# Patient Record
(demographics unavailable — no encounter records)

---

## 2024-10-26 NOTE — HISTORY OF PRESENT ILLNESS
[de-identified] : follow up fever and congestion and runny nose had fever tmax 102 no distress  [FreeTextEntry6] : fever resolved after two days no fever no distress no issues otherwise has been alert and active

## 2024-10-26 NOTE — DISCUSSION/SUMMARY
[FreeTextEntry1] : resolved URI if fever resturns to call back  apperaed well active iigabriel marion

## 2024-10-30 NOTE — HISTORY OF PRESENT ILLNESS
[Fever] : FEVER [___ Day(s)] : [unfilled] day(s) [Constant] : constant [Irritable] : irritable [Crying] : crying [At Night] : at night [Acetaminophen] : acetaminophen [Ibuprofen] : ibuprofen [Change in sleep pattern] : change in sleep pattern [Runny Nose] : runny nose [Cough] : cough [Decreased Appetite] : decreased appetite [Worsening] : worsening [Known Exposure to COVID-19] : no known exposure to COVID-19 [Hx of recent COVID-19 infection] : no history of recent COVID-19 infection [Sick Contacts: ___] : no sick contacts [Eye Redness] : no eye redness [Eye Discharge] : no eye discharge [Ear Tugging] : no ear tugging [Nasal Congestion] : no nasal congestion [Teething] : no teething [Wheezing] : no wheezing [Vomiting] : no vomiting [Diarrhea] : no diarrhea [Decreased Urine Output] : no decreased urine output [Rash] : no rash [FreeTextEntry6] : tmax 102 no other symptoms drinking well slight congestion no heavy cough in between fever was acting well no distress no pain no issues  [de-identified] : fever for 2 days

## 2024-10-30 NOTE — HISTORY OF PRESENT ILLNESS
[Fever] : FEVER [___ Day(s)] : [unfilled] day(s) [Constant] : constant [Irritable] : irritable [Crying] : crying [At Night] : at night [Acetaminophen] : acetaminophen [Ibuprofen] : ibuprofen [Change in sleep pattern] : change in sleep pattern [Runny Nose] : runny nose [Cough] : cough [Decreased Appetite] : decreased appetite [Worsening] : worsening [Known Exposure to COVID-19] : no known exposure to COVID-19 [Hx of recent COVID-19 infection] : no history of recent COVID-19 infection [Sick Contacts: ___] : no sick contacts [Eye Redness] : no eye redness [Eye Discharge] : no eye discharge [Ear Tugging] : no ear tugging [Nasal Congestion] : no nasal congestion [Teething] : no teething [Wheezing] : no wheezing [Vomiting] : no vomiting [Diarrhea] : no diarrhea [Rash] : no rash [Decreased Urine Output] : no decreased urine output [de-identified] : fever for 2 days  [FreeTextEntry6] : tmax 102 no other symptoms drinking well slight congestion no heavy cough in between fever was acting well no distress no pain no issues

## 2024-10-30 NOTE — DISCUSSION/SUMMARY
[FreeTextEntry1] : fever control  fluids if continues with fever in 2 days to call back  follow up as needed

## 2024-11-23 NOTE — PHYSICAL EXAM
LTM/EMU   Video: Yes  Photic: No  HV: No  Impedances: Under 5  Leads Fixed: Yes  Events seen: No  Patient ratio: 1/3   [Clear Rhinorrhea] : clear rhinorrhea [Mucoid Discharge] : mucoid discharge [Wheezing] : wheezing [Rales] : rales [Rhonchi] : no rhonchi [NL] : warm, clear

## 2024-11-23 NOTE — HISTORY OF PRESENT ILLNESS
[FreeTextEntry6] : states that has been coughing on  and off for the last 3 days mostly in the am mother hs just been giving saline otherwise doing well no distress  no diarrhea no other issues

## 2024-12-05 NOTE — PHYSICAL EXAM
[Clear Rhinorrhea] : clear rhinorrhea [Mucoid Discharge] : mucoid discharge [Wheezing] : wheezing [Rales] : rales [Rhonchi] : no rhonchi [NL] : warm, clear [de-identified] : right parotid and neck area swelling soft nonn tneder no erythema mild improvemnt from the other day

## 2024-12-05 NOTE — HISTORY OF PRESENT ILLNESS
[FreeTextEntry6] : follow up on parotitis is doing well no distress no fever eating well started meds 2 days ago and all blood work was reviewed and was all normal no vomiting no distress

## 2024-12-05 NOTE — DISCUSSION/SUMMARY
[FreeTextEntry1] : continue meds rtc in 1 week  send for sonogram if no imprvemnet then will send to [facility] as well

## 2024-12-20 NOTE — HISTORY OF PRESENT ILLNESS
[de-identified] : Follow up [FreeTextEntry6] : Pt is in today for a bump that she has on her neck, as per mom still the same size. no fever acting well was diagnosed with MUMPS

## 2024-12-20 NOTE — HISTORY OF PRESENT ILLNESS
[de-identified] : Follow up [FreeTextEntry6] : Pt is in today for a bump that she has on her neck, as per mom still the same size. no fever acting well was diagnosed with MUMPS

## 2024-12-20 NOTE — HISTORY OF PRESENT ILLNESS
[de-identified] : Follow up [FreeTextEntry6] : Pt is in today for a bump that she has on her neck, as per mom still the same size. no fever acting well was diagnosed with MUMPS

## 2024-12-20 NOTE — DISCUSSION/SUMMARY
[FreeTextEntry1] : send for sonogram  follo wup with ENT most likely to rule out cycstic hygroma expected relief not imprving in 4 weeks  rtc S/p consultant

## 2024-12-20 NOTE — HISTORY OF PRESENT ILLNESS
[de-identified] : Follow up [FreeTextEntry6] : Pt is in today for a bump that she has on her neck, as per mom still the same size. no fever acting well was diagnosed with MUMPS

## 2025-01-15 NOTE — REASON FOR VISIT
[Initial Evaluation] : an initial evaluation for [Neck Mass: _______________] : neck mass [unfilled] [Mother] : mother

## 2025-01-15 NOTE — HISTORY OF PRESENT ILLNESS
[de-identified] : This child presents with a lateral neck mass ON THE RIGHT  The mass was first noticed in November, 2024  Diagnosed with Parotitis at that time  There are no associated INFECTIONS with redness and pain that resolve on antibiotics. The lesion does not swell up and goes down in size. Took 7 day course of abx in November with no change in size of mass but appeared softer.   THERE IS NO ASSOCIATED DRAINAGE. US 12/20/24:  Impression:  Right parotid gland replaced by multiple cysts and/or a multicystic parotid mass. Differential diagnosis includes cystic lymphangioma or cystic parotid tumors including teratoma.  US neck soft tissue, 11/28/24: IMPRESSION:  Complex, multiloculated lesion in the right neck which may represent a lymphangitic malformation or infected branchial cleft cyst. Further evaluation with cross-sectional imaging could be obtained. There are no fevers, night sweats, chills.   There is no history of snoring, mouth breathing or witnessed apnea. No throat/tonsil infections. No problems with swallowing or with VPI/Speech/nasal regurgitation.  Passed NBHT AU.  Full term, uncomplicated delivery with uncomplicated pregnancy.  No cyanosis, no ETT intubation, no home oxygen requirement, no NICU stay.

## 2025-01-15 NOTE — CONSULT LETTER
[Dear  ___] : Dear  [unfilled], [Consult Letter:] : I had the pleasure of evaluating your patient, [unfilled]. [Please see my note below.] : Please see my note below. [Consult Closing:] : Thank you very much for allowing me to participate in the care of this patient.  If you have any questions, please do not hesitate to contact me. [Sincerely,] : Sincerely, [FreeTextEntry2] : Jacek Ferrari MD  [FreeTextEntry3] : Corinna Chisholm MD   Pediatric Otolaryngology/ Head & Neck Surgery [facility] , Otolaryngology; [facility]  [facility] [place] [address] Tel [phone] Fax [phone]

## 2025-01-15 NOTE — BIRTH HISTORY
[At ___ Weeks Gestation] : at [unfilled] weeks gestation [ Section] : by  section [Passed] : passed [de-identified] : preeclampsia  [de-identified] : NICU a few days [de-identified] : preeclampsia

## 2025-01-30 NOTE — HISTORY OF PRESENT ILLNESS
[Fever] : FEVER [Acetaminophen] : acetaminophen [Last dose: _____] : Last dose: [unfilled] [Decreased Appetite] : decreased appetite [Max Temp: ____] : Max temperature: [unfilled] [___ Day(s)] : [unfilled] day(s) [de-identified] : Pt being having fever since Wednesday night [Known Exposure to COVID-19] : no known exposure to COVID-19 [Hx of recent COVID-19 infection] : no history of recent COVID-19 infection [Sick Contacts: ___] : no sick contacts [Runny Nose] : no runny nose [Nasal Congestion] : no nasal congestion [Cough] : no cough [Stable] : stable

## 2025-01-30 NOTE — HISTORY OF PRESENT ILLNESS
[Fever] : FEVER [Acetaminophen] : acetaminophen [Last dose: _____] : Last dose: [unfilled] [Decreased Appetite] : decreased appetite [Max Temp: ____] : Max temperature: [unfilled] [___ Day(s)] : [unfilled] day(s) [de-identified] : Pt being having fever since Wednesday night [Known Exposure to COVID-19] : no known exposure to COVID-19 [Hx of recent COVID-19 infection] : no history of recent COVID-19 infection [Sick Contacts: ___] : no sick contacts [Runny Nose] : no runny nose [Nasal Congestion] : no nasal congestion [Cough] : no cough [Stable] : stable

## 2025-01-30 NOTE — DISCUSSION/SUMMARY
[FreeTextEntry1] : Patient likely with viral pharyngitis. Rapid strep performed in office is negative. Will send throat culture to rule out strep. Recommend supportive care with antipyretics, fluids, honey.

## 2025-03-26 NOTE — DISCUSSION/SUMMARY
[FreeTextEntry1] : Complete 10 days of antibiotic. Provide ibuprofen as needed for pain or fever. If no improvement within 48 hours return for re-evaluation. Follow up in 2-3 wks for tympanometry.

## 2025-03-26 NOTE — HISTORY OF PRESENT ILLNESS
[de-identified] : Fever, low appetite, low energy, right ear pain [FreeTextEntry6] : Mother states pt have fever, low appetite, low energy and right ear pain from last Thursday. Highest 102.5F. Mother has been treating it with Tylenol and Mortin 6ml no fever during visit

## 2025-07-21 NOTE — HISTORY OF PRESENT ILLNESS
[de-identified] : rash [FreeTextEntry6] : Rash on vaginal area that is spreading to inner thighs, no fever. c/o hoarse voice since Wednesday and also decrease appetite no diarrhea no distress

## 2025-07-21 NOTE — HISTORY OF PRESENT ILLNESS
[de-identified] : rash [FreeTextEntry6] : Rash on vaginal area that is spreading to inner thighs, no fever. c/o hoarse voice since Wednesday and also decrease appetite no diarrhea no distress

## 2025-07-21 NOTE — PHYSICAL EXAM
[Erythematous Oropharynx] : erythematous oropharynx [Enlarged Tonsils] : enlarged tonsils [Erythematous Labia Minora] : erythematous labia minora [Erythematous Labia Majora] : erythematous labia majora [NL] : warm, clear

## 2025-07-21 NOTE — DISCUSSION/SUMMARY
[FreeTextEntry1] : Patient likely with viral pharyngitis. Rapid strep perfromed in office is negative. Will send throat culture to rule out strep. Recommend supportive care with antipyretics, salt water gargles, and if age-appropriate throat lozenges.  if worsening of rash to call back